# Patient Record
Sex: MALE | Race: WHITE | NOT HISPANIC OR LATINO | Employment: FULL TIME | URBAN - NONMETROPOLITAN AREA
[De-identification: names, ages, dates, MRNs, and addresses within clinical notes are randomized per-mention and may not be internally consistent; named-entity substitution may affect disease eponyms.]

---

## 2018-11-20 ENCOUNTER — TRANSCRIBE ORDERS (OUTPATIENT)
Dept: ADMINISTRATIVE | Facility: HOSPITAL | Age: 58
End: 2018-11-20

## 2018-11-20 ENCOUNTER — HOSPITAL ENCOUNTER (OUTPATIENT)
Dept: GENERAL RADIOLOGY | Facility: HOSPITAL | Age: 58
Discharge: HOME | End: 2018-11-20

## 2018-11-20 ENCOUNTER — HOSPITAL ENCOUNTER (OUTPATIENT)
Dept: GENERAL RADIOLOGY | Facility: HOSPITAL | Age: 58
Discharge: HOME | End: 2018-11-20
Admitting: NURSE PRACTITIONER

## 2018-11-20 DIAGNOSIS — R05.9 COUGH: ICD-10-CM

## 2018-11-20 DIAGNOSIS — M25.512 LEFT SHOULDER PAIN, UNSPECIFIED CHRONICITY: Primary | ICD-10-CM

## 2018-11-20 DIAGNOSIS — R07.81 RIB PAIN ON LEFT SIDE: ICD-10-CM

## 2018-11-20 PROCEDURE — 71045 X-RAY EXAM CHEST 1 VIEW: CPT

## 2018-11-20 PROCEDURE — 73030 X-RAY EXAM OF SHOULDER: CPT

## 2018-11-20 PROCEDURE — 71101 X-RAY EXAM UNILAT RIBS/CHEST: CPT

## 2018-11-27 ENCOUNTER — HOSPITAL ENCOUNTER (OUTPATIENT)
Dept: GENERAL RADIOLOGY | Facility: HOSPITAL | Age: 58
Discharge: HOME | End: 2018-11-27
Admitting: NURSE PRACTITIONER

## 2018-11-27 ENCOUNTER — TRANSCRIBE ORDERS (OUTPATIENT)
Dept: ADMINISTRATIVE | Facility: HOSPITAL | Age: 58
End: 2018-11-27

## 2018-11-27 DIAGNOSIS — R05.9 COUGH: Primary | ICD-10-CM

## 2018-11-27 DIAGNOSIS — Z72.0 TOBACCO USE: ICD-10-CM

## 2018-11-27 DIAGNOSIS — R05.9 COUGH: ICD-10-CM

## 2018-11-27 PROCEDURE — 71046 X-RAY EXAM CHEST 2 VIEWS: CPT

## 2022-09-21 ENCOUNTER — CONSULT (OUTPATIENT)
Dept: ONCOLOGY | Facility: CLINIC | Age: 62
End: 2022-09-21

## 2022-09-21 VITALS
WEIGHT: 199 LBS | TEMPERATURE: 98.4 F | OXYGEN SATURATION: 99 % | HEIGHT: 71 IN | RESPIRATION RATE: 16 BRPM | DIASTOLIC BLOOD PRESSURE: 83 MMHG | SYSTOLIC BLOOD PRESSURE: 145 MMHG | HEART RATE: 74 BPM | BODY MASS INDEX: 27.86 KG/M2

## 2022-09-21 DIAGNOSIS — J44.9 CHRONIC OBSTRUCTIVE PULMONARY DISEASE, UNSPECIFIED COPD TYPE: ICD-10-CM

## 2022-09-21 DIAGNOSIS — C34.32 CANCER OF LOWER LOBE OF LEFT LUNG: Primary | ICD-10-CM

## 2022-09-21 PROCEDURE — 99204 OFFICE O/P NEW MOD 45 MIN: CPT | Performed by: INTERNAL MEDICINE

## 2022-09-21 RX ORDER — ERGOCALCIFEROL 1.25 MG/1
50000 CAPSULE ORAL WEEKLY
COMMUNITY
Start: 2022-06-15 | End: 2022-09-21

## 2022-09-21 RX ORDER — ATORVASTATIN CALCIUM 20 MG/1
20 TABLET, FILM COATED ORAL DAILY
COMMUNITY
Start: 2022-07-13 | End: 2022-09-21

## 2022-09-21 NOTE — PROGRESS NOTES
DATE OF CONSULTATION: 9/21/2022    REFERRING PHYSICIAN: Yina Garces APRN    Dear Yina Yepez APRN  Thank you for asking for my medical advice on this patient. 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  Presented with enlarging left lower lobe lung nodule  B.  Status post lobectomy with lymph node sampling done by Dr. West at [facility] January 26, 2022  C.  Final pathology revealed 1.2 cm moderate differentiated adenosquamous carcinoma with a clear margins and negative nodes.  2.  Right lower lobe 9 mm lung nodule:  A.  Evident on CT chest done July 7, 2022  3.  Mild COPD    HISTORY OF PRESENT ILLNESS: The patient is a very pleasant 61 y.o.  male   who was in his usual state of health until July 2022.  Patient presented for routine surveillance CT scan that revealed right lower lobe lung nodule 9 mm in size with multiple hypodense liver lesion.  The patient does have history of lung cancer that was resected from left lower lobe January 2018.  He was referred to me for further recommendations.    SUBJECTIVE: When I saw the patient today he is here by himself.  He is doing fairly well.  Staying active.  He is reluctant to do another surgery if he needs to have thoracotomy since he had some difficulties after his first surgery with some pain and prolonged shortness of breath after the procedure.  He continues to smoke intermittently.    Review of Systems   Constitutional: Positive for fatigue.   Respiratory: Negative.    Cardiovascular: Negative.    Gastrointestinal: Negative.    Musculoskeletal: Positive for arthralgias.       Past Medical History:   Diagnosis Date   • Arthritis    • History of ear infections    • Hypertension    • Sinusitis        Social History     Socioeconomic History   • Marital  status:    Tobacco Use   • Smoking status: Current Every Day Smoker     Packs/day: 1.00     Years: 40.00     Pack years: 40.00     Types: Cigarettes   • Smokeless tobacco: Never Used   Substance and Sexual Activity   • Alcohol use: No   • Drug use: No       Family History   Problem Relation Age of Onset   • Cancer Mother    • Cancer Father    • Heart disease Father    • Cancer Maternal Grandfather        No past surgical history on file.    Allergies   Allergen Reactions   • Penicillins           Current Outpatient Medications:   •  azithromycin (ZITHROMAX Z-SOBIA) 250 MG tablet, Take 2 tablets the first day, then 1 tablet daily for 4 days., Disp: 6 tablet, Rfl: 0    PHYSICAL EXAMINATION:   There were no vitals taken for this visit.  There were no vitals filed for this visit.                ECOG Performance Status: 1 - Symptomatic but completely ambulatory  General Appearance:  alert, cooperative, no apparent distress and appears stated age   Neurologic/Psychiatric: A&O x 3, gait steady, appropriate affect, strength 5/5 in all muscle groups   HEENT:  Normocephalic, without obvious abnormality, mucous membranes moist   Neck: Supple, symmetrical, trachea midline, no adenopathy;  No thyromegaly, masses, or tenderness   Lungs:   Clear to auscultation bilaterally; respirations regular, even, and unlabored bilaterally   Heart:  Regular rate and rhythm, no murmurs appreciated   Abdomen:   Soft, non-tender, non-distended and no organomegaly   Lymph nodes: No cervical, supraclavicular, inguinal or axillary adenopathy noted   Extremities: Normal, atraumatic; no clubbing, cyanosis, or edema    Skin: No rashes, ulcers, or suspicious lesions noted       No visits with results within 2 Week(s) from this visit.   Latest known visit with results is:   No results found for any previous visit.        No results found.      DIAGNOSTIC DATA:   1.  Extensive patient medical records including doctor's notes, oncology consult,  surgical report, pathology report, blood work results and imaging report reviewed by me and documented the patient's chart.    ASSESSMENT: The patient is a very pleasant 61 y.o.  male  with left lower lobe lung cancer    PLAN:     1.  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  I had a long discussion today with the patient about his  new diagnosis of lung cancer. I did go over the final pathology report in  detail with both of them. I reviewed the patient's documents including refereing provider's notes, lab results, imaging, and path report.  B.  The patient did not require adjuvant chemotherapy which is appropriate and in compliance with NCCN guidelines.    2.  Right lower lobe 9 mm nodule:  A.  I did go over the CT scan result with the patient from July 2022.  B.  Given the worrisome findings on the CT scan and his history of lung cancer I think is extremely important to get whole-body PET scan to further evaluate the current abnormalities.  C.  The patient will follow-up with me in 2 weeks to go over the results.    3.  COPD:  A.  Asked patient that if he has to have surgery we have to check his PFTs prior.  B.  Continue inhalers.    FOLLOW UP: 2 weeks with PET scan prior    Thiago Celestin MD  9/21/2022

## 2022-10-04 ENCOUNTER — HOSPITAL ENCOUNTER (OUTPATIENT)
Dept: PET IMAGING | Facility: HOSPITAL | Age: 62
Discharge: HOME | End: 2022-10-04

## 2022-10-04 DIAGNOSIS — C34.32 CANCER OF LOWER LOBE OF LEFT LUNG: ICD-10-CM

## 2022-10-04 LAB — GLUCOSE BLDC GLUCOMTR-MCNC: 90 MG/DL (ref 70–130)

## 2022-10-04 PROCEDURE — 0 FLUDEOXYGLUCOSE F18 SOLUTION: Performed by: INTERNAL MEDICINE

## 2022-10-04 PROCEDURE — A9552 F18 FDG: HCPCS | Performed by: INTERNAL MEDICINE

## 2022-10-04 PROCEDURE — 82962 GLUCOSE BLOOD TEST: CPT

## 2022-10-04 PROCEDURE — 78815 PET IMAGE W/CT SKULL-THIGH: CPT

## 2022-10-04 RX ADMIN — FLUDEOXYGLUCOSE F18 1 DOSE: 300 INJECTION INTRAVENOUS at 14:44

## 2022-10-12 ENCOUNTER — OFFICE VISIT (OUTPATIENT)
Dept: ONCOLOGY | Facility: CLINIC | Age: 62
End: 2022-10-12

## 2022-10-12 VITALS
WEIGHT: 197 LBS | TEMPERATURE: 98.9 F | OXYGEN SATURATION: 97 % | BODY MASS INDEX: 27.58 KG/M2 | DIASTOLIC BLOOD PRESSURE: 82 MMHG | SYSTOLIC BLOOD PRESSURE: 118 MMHG | HEIGHT: 71 IN | RESPIRATION RATE: 14 BRPM | HEART RATE: 77 BPM

## 2022-10-12 DIAGNOSIS — C34.32 CANCER OF LOWER LOBE OF LEFT LUNG: Primary | ICD-10-CM

## 2022-10-12 PROCEDURE — 99214 OFFICE O/P EST MOD 30 MIN: CPT | Performed by: INTERNAL MEDICINE

## 2022-10-12 NOTE — PROGRESS NOTES
DATE OF VISIT: 10/12/2022    REASON FOR VISIT: Followup for left lower lobe lung cancer     PROBLEM LIST:  1.   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  Presented with enlarging left lower lobe lung nodule  B.  Status post lobectomy with lymph node sampling done by Dr. West at [facility] January 26, 2022  C.  Final pathology revealed 1.2 cm moderate differentiated adenosquamous carcinoma with a clear margins and negative nodes.  2.  Right lower lobe 9 mm lung nodule:  A.  Evident on CT chest done July 7, 2022  3.  Mild COPD      HISTORY OF PRESENT ILLNESS: The patient is a very pleasant 61 y.o. male  with past medical history significant for left lower lobe lung cancer diagnosed January 2022.  Given his early stage she did not require adjuvant treatment.  The patient is here today for scheduled follow-up visit.    SUBJECTIVE: The patient is here today with his sister.  He is doing fairly well.  He denied any fever chills night sweats.  He is anxious about the scan results.    Past History:  Medical History: has a past medical history of Arthritis, History of ear infections, Hypertension, and Sinusitis.   Surgical History: has a past surgical history that includes Lung lobectomy (Left).   Family History: family history includes Brain cancer in his father; Breast cancer in his maternal great-grandmother and paternal aunt; Cancer in his maternal grandfather; Colon cancer in his mother; Heart disease in his father.   Social History: reports that he has quit smoking. His smoking use included cigarettes. He has a 50.00 pack-year smoking history. He has never used smokeless tobacco. He reports that he does not drink alcohol and does not use drugs.    (Not in a hospital admission)     Allergies: Penicillins     Review of Systems   Constitutional: Positive for fatigue.   Respiratory: Negative.    Cardiovascular: Negative.    Gastrointestinal: Negative.        PHYSICAL EXAMINATION:   BP  "118/82   Pulse 77   Temp 98.9 °F (37.2 °C) (Temporal)   Resp 14   Ht 180.3 cm (71\")   Wt 89.4 kg (197 lb)   SpO2 97%   BMI 27.48 kg/m²    Pain Score    10/12/22 1449   PainSc: 0-No pain                     ECOG Performance Status: 1 - Symptomatic but completely ambulatory      General Appearance:      alert, cooperative, no apparent distress and appears stated age   Lungs:   Clear to auscultation bilaterally; respirations regular, even, and unlabored bilaterally   Heart:  Regular rate and rhythm, no murmurs appreciated   Abdomen:   Soft, non-tender, non-distended and no organomegaly                 Hospital Outpatient Visit on 10/04/2022   Component Date Value Ref Range Status   • Glucose 10/04/2022 90  70 - 130 mg/dL Final    Meter: [id number] : [id number] Kamilah Rocio        NM PET/CT Skull Base to Mid Thigh    Result Date: 10/4/2022  Narrative: DATE OF EXAM: 10/4/2022 2:30 PM  PROCEDURE: NM PET/CT SKULL BASE TO MID THIGH-  INDICATIONS: re-staging scans, lung nodule; C34.32-Malignant neoplasm of lower lobe, left bronchus or lung  TECHNIQUE: 12.66 mCi of F-18 labeled FDG was administered intravenously followed by PET imaging from the skull vertex through the mid thighs. Low-dose non contrast CT imaging of the same body region was performed. Fused PET-CT images and 3D MIP PET images were utilized for interpretation. Blood glucose level at the time of injection was 90 mg/dl.  COMPARISON: None  HEAD AND NECK: Physiologic hypermetabolism of the aerodigestive tract structures is present, without hypermetabolic cervical adenopathy or aerodigestive tract mass. Normal hypermetabolism of the cerebral hemispheres.  CHEST: There is physiologic hypermetabolism of the left ventricular myocardium. Postoperative changes are noted from prior left lower lobectomy. There is no discrete hypermetabolic mediastinal adenopathy or pulmonary nodularity.  ABDOMEN AND PELVIS: There is physiologic activity of the gastrointestinal " and genitourinary tracts, without focal hypermetabolism concerning for acute or neoplastic process. Osseous structures are unremarkable diffusely.      Impression: Negative PET/CT. Postoperative changes are noted from prior left lower lobectomy. There is no evidence of hypermetabolic pulmonary nodularity, thoracic adenopathy or distant metastatic involvement.  This report was finalized on 10/4/2022 4:04 PM by Troy Goncalves.        ASSESSMENT: The patient is a very pleasant 61 y.o. male  with left lower lobe lung cancer      PLAN:    1.  Left lower lobe lung cancer:  A.  I did go over the PET scan result with the patient on October 4, 2022.  Reassured the patient no evidence of abnormalities were noted.  B.  He will follow with us in 6 months with CT chest only.    2.  Right lower lobe 8 mm lung nodule:  A.  Not PET avid on the current PET scan.  Also stable in size.  B.  I will repeat CT chest in 6 months.    3.  Mild COPD,  A.  Patient continue inhalers as needed    FOLLOW UP: 6 months CT chest.    Thiago Celestin MD  10/12/2022

## 2023-04-10 ENCOUNTER — HOSPITAL ENCOUNTER (OUTPATIENT)
Dept: CT IMAGING | Facility: HOSPITAL | Age: 63
Discharge: HOME | End: 2023-04-10
Admitting: INTERNAL MEDICINE
Payer: COMMERCIAL

## 2023-04-10 DIAGNOSIS — C34.32 CANCER OF LOWER LOBE OF LEFT LUNG: ICD-10-CM

## 2023-04-10 PROCEDURE — 71260 CT THORAX DX C+: CPT

## 2023-04-10 PROCEDURE — 25510000001 IOPAMIDOL 61 % SOLUTION: Performed by: INTERNAL MEDICINE

## 2023-04-10 RX ADMIN — IOPAMIDOL 100 ML: 612 INJECTION, SOLUTION INTRAVENOUS at 10:34

## 2023-04-12 ENCOUNTER — OFFICE VISIT (OUTPATIENT)
Dept: ONCOLOGY | Facility: CLINIC | Age: 63
End: 2023-04-12
Payer: COMMERCIAL

## 2023-04-12 VITALS
TEMPERATURE: 98.9 F | OXYGEN SATURATION: 98 % | RESPIRATION RATE: 16 BRPM | HEIGHT: 71 IN | WEIGHT: 212 LBS | BODY MASS INDEX: 29.68 KG/M2 | DIASTOLIC BLOOD PRESSURE: 79 MMHG | SYSTOLIC BLOOD PRESSURE: 136 MMHG | HEART RATE: 85 BPM

## 2023-04-12 DIAGNOSIS — C34.32 CANCER OF LOWER LOBE OF LEFT LUNG: Primary | ICD-10-CM

## 2023-04-12 PROCEDURE — 99214 OFFICE O/P EST MOD 30 MIN: CPT | Performed by: INTERNAL MEDICINE

## 2023-04-12 NOTE — PROGRESS NOTES
DATE OF VISIT: 4/12/2023    REASON FOR VISIT: Followup for left lower lobe lung cancer     PROBLEM LIST:  1.   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  Presented with enlarging left lower lobe lung nodule  B.  Status post lobectomy with lymph node sampling done by Dr. West at [facility] January 26, 2022  C.  Final pathology revealed 1.2 cm moderate differentiated adenosquamous carcinoma with a clear margins and negative nodes.  2.  Right lower lobe 9 mm lung nodule:  A.  Evident on CT chest done July 7, 2022  3.  Mild COPD      HISTORY OF PRESENT ILLNESS: The patient is a very pleasant 62 y.o. male  with past medical history significant for left lower lobe lung cancer diagnosed January 2022.  Given his early stage she did not require adjuvant treatment.  The patient is here today for scheduled follow-up visit.    SUBJECTIVE: Erich is here today by himself.  He is doing fairly well.  He is anxious about the scan results.  His breathing is stable.    Past History:  Medical History: has a past medical history of Arthritis, History of ear infections, Hypertension, and Sinusitis.   Surgical History: has a past surgical history that includes Lung lobectomy (Left).   Family History: family history includes Brain cancer in his father; Breast cancer in his maternal great-grandmother and paternal aunt; Cancer in his maternal grandfather; Colon cancer in his mother; Heart disease in his father.   Social History: reports that he has quit smoking. His smoking use included cigarettes. He has a 50.00 pack-year smoking history. He has never used smokeless tobacco. He reports that he does not drink alcohol and does not use drugs.    (Not in a hospital admission)     Allergies: Penicillins     Review of Systems   Constitutional: Positive for fatigue.   Respiratory: Negative.    Cardiovascular: Negative.    Gastrointestinal: Negative.        PHYSICAL EXAMINATION:   There were no vitals taken for  this visit.   There were no vitals filed for this visit.                  ECOG Performance Status: 1 - Symptomatic but completely ambulatory      General Appearance:      alert, cooperative, no apparent distress and appears stated age   Lungs:   Clear to auscultation bilaterally; respirations regular, even, and unlabored bilaterally   Heart:  Regular rate and rhythm, no murmurs appreciated   Abdomen:   Soft, non-tender, non-distended and no organomegaly                 No visits with results within 2 Week(s) from this visit.   Latest known visit with results is:   Hospital Outpatient Visit on 10/04/2022   Component Date Value Ref Range Status   • Glucose 10/04/2022 90  70 - 130 mg/dL Final    Meter: [id number] : [id number] Kamilah Chan        CT Chest With Contrast Diagnostic    Result Date: 4/10/2023  Narrative: CT SCAN OF THE CHEST WITH CONTRAST    4/10/2023 10:19 AM  HISTORY: f/u scans; C34.32-Malignant neoplasm of lower lobe, left bronchus or lung  PROCEDURE: Multiple axial CT images were obtained from the lung apex to the mid abdomen following intravenous contrast administration. Sagittal and coronal reformatted images were generated from the axial data set and provided for interpretation. This study was performed with techniques to keep radiation doses as low as reasonably achievable, (ALARA). Individualized dose reduction techniques using automated exposure control or adjustment of mA and/or kV according to the patient size were employed.  COMPARISON: PET scan dated October 2022  FINDINGS:  CHEST: Lungs/Pleura: There are postsurgical changes of left lower lobectomy. There is emphysema with bullous changes. There is a stable right upper lobe 9 mm pulmonary nodule. No pneumothorax or pleural effusion.  Vessels: No large central pulmonary embolus. Thoracic aorta is normal in caliber.   Heart/mediastinum: The heart is normal in size. No pericardial effusion.  Lymph nodes: There are mildly prominent mediastinal  and hilar lymph nodes, unchanged.  Chest wall: No acute findings.  Bones: No acute fracture.  Upper abdomen: There are stable cystic lesions in the liver and left kidney.      Impression:  1. Stable exam of the chest. 2. No evidence of disease progression or recurrence.   243.62 mGy.cm   This study was performed with techniques to keep radiation doses as low as reasonably achievable (ALARA). Individualized dose reduction techniques using automated exposure control or adjustment of mA and/or kV according to the patient size were employed.    Images were reviewed, interpreted, and dictated by JOSE CARLOS Camacho Transcribed by Brenda Ham PA-C.  This report was signed and finalized on 4/10/2023 11:53 AM by JOSE CARLOS Boyd.      ASSESSMENT: The patient is a very pleasant 62 y.o. male  with left lower lobe lung cancer      PLAN:    1.  Left lower lobe lung cancer:  A.  I did go over the scan results with the patient from April 10, 2023.  I reassured the patient there is no evidence of disease recurrence or progression.  I reviewed the films myself and I went over pictures with him.  B.  We will continue watchful waiting.  The patient will follow-up with us in 6-month repeated scans.    2.  Right lower lobe 8 mm lung nodule:  A.  I reviewed the patient the scan films myself.  I went over the results with the patient and reassured him the lung nodule is essentially stable.  This was not PET avid before I will continue watchful waiting.  B.  I will repeat CT chest in 6 months.    3.  Mild COPD,  A.  Patient continue inhalers as needed    FOLLOW UP: 6 months CT chest.    Thiago Celestin MD  4/12/2023

## 2023-10-09 ENCOUNTER — HOSPITAL ENCOUNTER (OUTPATIENT)
Dept: CT IMAGING | Facility: HOSPITAL | Age: 63
Discharge: HOME | End: 2023-10-09
Admitting: INTERNAL MEDICINE
Payer: COMMERCIAL

## 2023-10-09 DIAGNOSIS — C34.32 CANCER OF LOWER LOBE OF LEFT LUNG: ICD-10-CM

## 2023-10-09 PROCEDURE — 25510000001 IOPAMIDOL 61 % SOLUTION: Performed by: INTERNAL MEDICINE

## 2023-10-09 PROCEDURE — 71260 CT THORAX DX C+: CPT

## 2023-10-09 RX ADMIN — IOPAMIDOL 100 ML: 612 INJECTION, SOLUTION INTRAVENOUS at 07:50

## 2023-10-11 ENCOUNTER — OFFICE VISIT (OUTPATIENT)
Dept: ONCOLOGY | Facility: CLINIC | Age: 63
End: 2023-10-11
Payer: COMMERCIAL

## 2023-10-11 ENCOUNTER — LAB (OUTPATIENT)
Dept: LAB | Facility: HOSPITAL | Age: 63
End: 2023-10-11
Payer: COMMERCIAL

## 2023-10-11 VITALS
OXYGEN SATURATION: 98 % | SYSTOLIC BLOOD PRESSURE: 139 MMHG | HEART RATE: 89 BPM | DIASTOLIC BLOOD PRESSURE: 84 MMHG | BODY MASS INDEX: 28.7 KG/M2 | HEIGHT: 71 IN | TEMPERATURE: 98 F | WEIGHT: 205 LBS | RESPIRATION RATE: 16 BRPM

## 2023-10-11 DIAGNOSIS — C34.32 CANCER OF LOWER LOBE OF LEFT LUNG: Primary | ICD-10-CM

## 2023-10-11 LAB
BASOPHILS # BLD AUTO: 0.05 10*3/MM3 (ref 0–0.2)
BASOPHILS NFR BLD AUTO: 0.4 % (ref 0–1.5)
DEPRECATED RDW RBC AUTO: 45.1 FL (ref 37–54)
EOSINOPHIL # BLD AUTO: 0.11 10*3/MM3 (ref 0–0.4)
EOSINOPHIL NFR BLD AUTO: 0.9 % (ref 0.3–6.2)
ERYTHROCYTE [DISTWIDTH] IN BLOOD BY AUTOMATED COUNT: 13.1 % (ref 12.3–15.4)
HCT VFR BLD AUTO: 44.5 % (ref 37.5–51)
HGB BLD-MCNC: 15 G/DL (ref 13–17.7)
IMM GRANULOCYTES # BLD AUTO: 0.03 10*3/MM3 (ref 0–0.05)
IMM GRANULOCYTES NFR BLD AUTO: 0.3 % (ref 0–0.5)
LYMPHOCYTES # BLD AUTO: 2.12 10*3/MM3 (ref 0.7–3.1)
LYMPHOCYTES NFR BLD AUTO: 18.1 % (ref 19.6–45.3)
MCH RBC QN AUTO: 31.4 PG (ref 26.6–33)
MCHC RBC AUTO-ENTMCNC: 33.7 G/DL (ref 31.5–35.7)
MCV RBC AUTO: 93.3 FL (ref 79–97)
MONOCYTES # BLD AUTO: 0.57 10*3/MM3 (ref 0.1–0.9)
MONOCYTES NFR BLD AUTO: 4.9 % (ref 5–12)
NEUTROPHILS NFR BLD AUTO: 75.4 % (ref 42.7–76)
NEUTROPHILS NFR BLD AUTO: 8.81 10*3/MM3 (ref 1.7–7)
NRBC BLD AUTO-RTO: 0 /100 WBC (ref 0–0.2)
PLATELET # BLD AUTO: 277 10*3/MM3 (ref 140–450)
PMV BLD AUTO: 10 FL (ref 6–12)
RBC # BLD AUTO: 4.77 10*6/MM3 (ref 4.14–5.8)
WBC NRBC COR # BLD: 11.69 10*3/MM3 (ref 3.4–10.8)

## 2023-10-11 PROCEDURE — 85025 COMPLETE CBC W/AUTO DIFF WBC: CPT | Performed by: NURSE PRACTITIONER

## 2023-10-11 PROCEDURE — 36415 COLL VENOUS BLD VENIPUNCTURE: CPT | Performed by: NURSE PRACTITIONER

## 2023-10-11 PROCEDURE — 99214 OFFICE O/P EST MOD 30 MIN: CPT | Performed by: NURSE PRACTITIONER

## 2023-10-11 NOTE — PROGRESS NOTES
DATE OF VISIT: 10/11/2023    REASON FOR VISIT: Followup for left lower lobe lung cancer     PROBLEM LIST:  1.   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  Presented with enlarging left lower lobe lung nodule  B.  Status post lobectomy with lymph node sampling done by Dr. West at [facility] January 26, 2022  C.  Final pathology revealed 1.2 cm moderate differentiated adenosquamous carcinoma with a clear margins and negative nodes.  2.  Right lower lobe 9 mm lung nodule:  A.  Evident on CT chest done July 7, 2022  3.  Mild COPD      HISTORY OF PRESENT ILLNESS: The patient is a very pleasant 62 y.o. male  with past medical history significant for left lower lobe lung cancer diagnosed January 2022.  Given his early stage she did not require adjuvant treatment.  The patient is here today for scheduled follow-up visit.    SUBJECTIVE: Erich is here today by himself.  Overall, he is doing fairly well.  He is anxious about his scan results.  Breathing is stable.      Past History:  Medical History: has a past medical history of Arthritis, History of ear infections, Hypertension, and Sinusitis.   Surgical History: has a past surgical history that includes Lung lobectomy (Left).   Family History: family history includes Brain cancer in his father; Breast cancer in his maternal great-grandmother and paternal aunt; Cancer in his maternal grandfather; Colon cancer in his mother; Heart disease in his father.   Social History: reports that he has quit smoking. His smoking use included cigarettes. He has a 50.00 pack-year smoking history. He has never used smokeless tobacco. He reports that he does not drink alcohol and does not use drugs.    (Not in a hospital admission)     Allergies: Penicillins     Review of Systems   Constitutional:  Positive for fatigue.   Respiratory: Negative.     Cardiovascular: Negative.    Gastrointestinal: Negative.    All other systems reviewed and are  "negative.      PHYSICAL EXAMINATION:   /84   Pulse 89   Temp 98 øF (36.7 øC) (Temporal)   Resp 16   Ht 180.3 cm (71\")   Wt 93 kg (205 lb)   SpO2 98%   BMI 28.59 kg/mý    Pain Score    10/11/23 1317   PainSc: 0-No pain        ECOG score: 0            ECOG Performance Status: 1 - Symptomatic but completely ambulatory      General Appearance:      alert, cooperative, no apparent distress and appears stated age   Lungs:   Clear to auscultation bilaterally; respirations regular, even, and unlabored bilaterally   Heart:  Regular rate and rhythm, no murmurs appreciated   Abdomen:   Soft, non-tender, and non-distended                 No visits with results within 2 Week(s) from this visit.   Latest known visit with results is:   Hospital Outpatient Visit on 10/04/2022   Component Date Value Ref Range Status    Glucose 10/04/2022 90  70 - 130 mg/dL Final    Meter: [id number] : [id number] Kamilah Beckwithi        CT Chest With Contrast Diagnostic    Result Date: 10/9/2023  Narrative: PROCEDURE: CT CHEST W CONTRAST DIAGNOSTIC-  HISTORY: f/u scans; C34.32-Malignant neoplasm of lower lobe, left bronchus or lung  COMPARISON: April 10, 2023  PROCEDURE: The patient was injected with IV contrast.  Axial images were obtained from the lung apex to the mid abdomen by computed tomography. This study was performed with techniques to keep radiation doses as low as reasonably achievable, (ALARA). Individualized dose reduction techniques using automated exposure control or adjustment of mA and/or kV according to the patient size were employed.  FINDINGS:  CHEST: There is no suspicious axillary adenopathy. Benign vascular calcifications noted. Postsurgical change from previous left lower lobectomy is stable. Again noted is the 9 mm minimally lobulated nodule left upper lobe, question slight interval increase in lobular contour compared to prior exam. Recommend 3 to 4-month follow-up CT. This is borderline for detection on PET/CT. " No bony destructive lesion identified. No other noncalcified nodules seen. There is no suspicious hilar or mediastinal adenopathy.  The heart is proper size. There is no pericardial or pleural effusion.  No suspicious infiltrate or nodule identified.  Limited images of the upper abdomen again demonstrate multiple, circumscribed, hypodense hepatic lesions likely representing cysts. Fullness of the left adrenal gland noted.      Impression: Question slight interval enlargement right upper lobe 9 mm noncalcified lobulated nodule; recommend 3 to 4-month follow-up CT. Lesion is borderline in size for detection on PET/CT.    CTDI: 4.48 mGy DLP:204.43 mGy.cm  This report was signed and finalized on 10/9/2023 9:06 AM by Rocio Zhu MD.         ASSESSMENT: The patient is a very pleasant 62 y.o. male  with left lower lobe lung cancer      PLAN:    1.  Left lower lobe lung cancer:  A.  I did go over the scan results with the patient from October 9 , 2023.  I discussed with the patient that scan showed a slight interval enlargement of the right upper lobe 9 mm noncalcified nodule.  We will plan to follow-up in 3 months instead of 6 for CT scan.   I reviewed the films myself and I went over pictures with him.  B.  We will continue watchful waiting.  The patient will follow-up with us in 3-months repeated scans.  C. We will check CBC and CMP today and I will call him with results.     2.  Right lower lobe 9 mm lung nodule:  A.  Slight interval increase of the size of the lung nodule.  This was not PET avid before I will continue watchful waiting.  B.  I will repeat CT chest in 3 months.    3.  Mild COPD,  A.  Patient continue inhalers as needed    FOLLOW UP: 3 months CT chest.    Eli Wright, APRN  10/11/2023

## 2024-01-24 ENCOUNTER — HOSPITAL ENCOUNTER (OUTPATIENT)
Dept: CT IMAGING | Facility: HOSPITAL | Age: 64
Discharge: HOME | End: 2024-01-24
Admitting: NURSE PRACTITIONER
Payer: COMMERCIAL

## 2024-01-24 DIAGNOSIS — C34.32 CANCER OF LOWER LOBE OF LEFT LUNG: ICD-10-CM

## 2024-01-24 PROCEDURE — 25510000001 IOPAMIDOL 61 % SOLUTION: Performed by: NURSE PRACTITIONER

## 2024-01-24 PROCEDURE — 71260 CT THORAX DX C+: CPT

## 2024-01-24 RX ADMIN — IOPAMIDOL 100 ML: 612 INJECTION, SOLUTION INTRAVENOUS at 07:44

## 2024-01-31 ENCOUNTER — OFFICE VISIT (OUTPATIENT)
Dept: ONCOLOGY | Facility: CLINIC | Age: 64
End: 2024-01-31
Payer: COMMERCIAL

## 2024-01-31 VITALS
DIASTOLIC BLOOD PRESSURE: 78 MMHG | OXYGEN SATURATION: 98 % | WEIGHT: 217 LBS | HEART RATE: 79 BPM | SYSTOLIC BLOOD PRESSURE: 149 MMHG | RESPIRATION RATE: 16 BRPM | TEMPERATURE: 98.2 F | HEIGHT: 71 IN | BODY MASS INDEX: 30.38 KG/M2

## 2024-01-31 DIAGNOSIS — C34.32 CANCER OF LOWER LOBE OF LEFT LUNG: Primary | ICD-10-CM

## 2024-01-31 PROCEDURE — 99214 OFFICE O/P EST MOD 30 MIN: CPT | Performed by: INTERNAL MEDICINE

## 2024-01-31 NOTE — PROGRESS NOTES
DATE OF VISIT: 1/31/2024    REASON FOR VISIT: Followup for left lower lobe lung cancer     PROBLEM LIST:  1.   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  Presented with enlarging left lower lobe lung nodule  B.  Status post lobectomy with lymph node sampling done by Dr. West at [facility] January 26, 2022  C.  Final pathology revealed 1.2 cm moderate differentiated adenosquamous carcinoma with a clear margins and negative nodes.  2.  Right lower lobe 9 mm lung nodule:  A.  Evident on CT chest done July 7, 2022  3.  Mild COPD      HISTORY OF PRESENT ILLNESS: The patient is a very pleasant 63 y.o. male  with past medical history significant for left lower lobe lung cancer diagnosed January 2022.  Given his early stage she did not require adjuvant treatment.  The patient is here today for scheduled follow-up visit.    SUBJECTIVE: Erich is here today by himself.  All in all he is doing fairly well.  Any fever chills night the sweats.  He is anxious about the scan result.    Past History:  Medical History: has a past medical history of Arthritis, History of ear infections, Hypertension, and Sinusitis.   Surgical History: has a past surgical history that includes Lung lobectomy (Left).   Family History: family history includes Brain cancer in his father; Breast cancer in his maternal great-grandmother and paternal aunt; Cancer in his maternal grandfather; Colon cancer in his mother; Heart disease in his father.   Social History: reports that he has quit smoking. His smoking use included cigarettes. He has a 50.00 pack-year smoking history. He has never used smokeless tobacco. He reports that he does not drink alcohol and does not use drugs.    (Not in a hospital admission)     Allergies: Penicillins     Review of Systems   Constitutional:  Positive for fatigue.   Respiratory: Negative.     Cardiovascular: Negative.    Gastrointestinal: Negative.        PHYSICAL EXAMINATION:   BP  "149/78   Pulse 79   Temp 98.2 °F (36.8 °C)   Resp 16   Ht 180.3 cm (71\")   Wt 98.4 kg (217 lb)   SpO2 98%   BMI 30.27 kg/m²    Pain Score    01/31/24 1257   PainSc: 0-No pain                       ECOG Performance Status: 1 - Symptomatic but completely ambulatory      General Appearance:      alert, cooperative, no apparent distress and appears stated age   Lungs:   Clear to auscultation bilaterally; respirations regular, even, and unlabored bilaterally   Heart:  Regular rate and rhythm, no murmurs appreciated   Abdomen:   Soft, non-tender, and non-distended                 No visits with results within 2 Week(s) from this visit.   Latest known visit with results is:   Office Visit on 10/11/2023   Component Date Value Ref Range Status    WBC 10/11/2023 11.69 (H)  3.40 - 10.80 10*3/mm3 Final    RBC 10/11/2023 4.77  4.14 - 5.80 10*6/mm3 Final    Hemoglobin 10/11/2023 15.0  13.0 - 17.7 g/dL Final    Hematocrit 10/11/2023 44.5  37.5 - 51.0 % Final    MCV 10/11/2023 93.3  79.0 - 97.0 fL Final    MCH 10/11/2023 31.4  26.6 - 33.0 pg Final    MCHC 10/11/2023 33.7  31.5 - 35.7 g/dL Final    RDW 10/11/2023 13.1  12.3 - 15.4 % Final    RDW-SD 10/11/2023 45.1  37.0 - 54.0 fl Final    MPV 10/11/2023 10.0  6.0 - 12.0 fL Final    Platelets 10/11/2023 277  140 - 450 10*3/mm3 Final    Neutrophil % 10/11/2023 75.4  42.7 - 76.0 % Final    Lymphocyte % 10/11/2023 18.1 (L)  19.6 - 45.3 % Final    Monocyte % 10/11/2023 4.9 (L)  5.0 - 12.0 % Final    Eosinophil % 10/11/2023 0.9  0.3 - 6.2 % Final    Basophil % 10/11/2023 0.4  0.0 - 1.5 % Final    Immature Grans % 10/11/2023 0.3  0.0 - 0.5 % Final    Neutrophils, Absolute 10/11/2023 8.81 (H)  1.70 - 7.00 10*3/mm3 Final    Lymphocytes, Absolute 10/11/2023 2.12  0.70 - 3.10 10*3/mm3 Final    Monocytes, Absolute 10/11/2023 0.57  0.10 - 0.90 10*3/mm3 Final    Eosinophils, Absolute 10/11/2023 0.11  0.00 - 0.40 10*3/mm3 Final    Basophils, Absolute 10/11/2023 0.05  0.00 - 0.20 10*3/mm3 " Final    Immature Grans, Absolute 10/11/2023 0.03  0.00 - 0.05 10*3/mm3 Final    nRBC 10/11/2023 0.0  0.0 - 0.2 /100 WBC Final        CT Chest With Contrast Diagnostic    Result Date: 1/24/2024  Narrative: PROCEDURE: CT CHEST W CONTRAST DIAGNOSTIC-  HISTORY: Follow up CT scan; C34.32-Malignant neoplasm of lower lobe, left bronchus or lung  COMPARISON: October 9, 2023  PROCEDURE: The patient was injected with IV contrast.  Axial images were obtained from the lung apex to the mid abdomen by computed tomography. This study was performed with techniques to keep radiation doses as low as reasonably achievable, (ALARA). Individualized dose reduction techniques using automated exposure control or adjustment of mA and/or kV according to the patient size were employed.  FINDINGS:  CHEST: There is no suspicious axillary adenopathy. There is no new suspicious hilar or mediastinal adenopathy.  The heart is proper size. There is no pericardial or pleural effusion. Again noted is the mildly lobulated solid nodule right upper lobe anterior laterally measuring up to 8.4 mm, not significantly changed from the prior exam. Mild emphysematous change noted. There is stable linear scarring left lower lobe. Again noted is postsurgical change in the left lung with volume loss, elevation left hemidiaphragm and surgical clips in the left hilum consistent with left lower lobectomy; findings are stable. Limited images of the upper abdomen again demonstrates several circumscribed hypodense hepatic lesions consistent with cysts. Vascular calcifications noted. No bony destructive lesion seen.      Impression: Stable 8.4 mm right upper lobe nodule, recommend continued follow-up given the history of previous left lung malignancy.    CTDI: 4.29 mGy DLP:185.7 mGy.cm  This report was signed and finalized on 1/24/2024 8:19 AM by Rocio Zhu MD.         ASSESSMENT: The patient is a very pleasant 63 y.o. male  with left lower lobe lung  cancer      PLAN:    1.  Left lower lobe lung cancer:  A.  I did go over the CT scan result with the patient from July 24, 2024.  No evidence of recurrence on the left side.    2.  Right lower lobe 9 mm lung nodule:  A.  I reassured the patient lung nodule stable on the current CT scan done July 24, 2024.  Will continue surveillance.    3.  Mild COPD,  A.  Patient continue inhalers as needed    FOLLOW UP: 6 months CT chest.    Thiago Celestin MD  1/31/2024

## 2024-07-23 ENCOUNTER — TELEPHONE (OUTPATIENT)
Dept: ONCOLOGY | Facility: CLINIC | Age: 64
End: 2024-07-23

## 2024-07-23 NOTE — TELEPHONE ENCOUNTER
Caller: Erich Wilkes    Relationship to patient: Self    Best call back number: [phone]     Chief complaint: R/S    Type of visit: FOLLOW UP 1    Requested date: PLEASE CALL PT TO R/S FOR MATTHEW, WILL NEED A DIFFERENT DAY AND ASKING FOR EARLIEST TIME POSSIBLE.  OK TO LEAVE VM IF NO ANSWER.     If rescheduling, when is the original appointment: 7/31

## 2024-07-29 ENCOUNTER — HOSPITAL ENCOUNTER (OUTPATIENT)
Dept: CT IMAGING | Facility: HOSPITAL | Age: 64
Discharge: HOME | End: 2024-07-29
Admitting: INTERNAL MEDICINE
Payer: COMMERCIAL

## 2024-07-29 DIAGNOSIS — C34.32 CANCER OF LOWER LOBE OF LEFT LUNG: ICD-10-CM

## 2024-07-29 PROCEDURE — 71260 CT THORAX DX C+: CPT

## 2024-07-29 PROCEDURE — 25510000001 IOPAMIDOL 61 % SOLUTION: Performed by: INTERNAL MEDICINE

## 2024-07-29 RX ADMIN — IOPAMIDOL 100 ML: 612 INJECTION, SOLUTION INTRAVENOUS at 08:36

## 2024-08-14 ENCOUNTER — OFFICE VISIT (OUTPATIENT)
Dept: ONCOLOGY | Facility: CLINIC | Age: 64
End: 2024-08-14
Payer: COMMERCIAL

## 2024-08-14 VITALS
HEART RATE: 70 BPM | DIASTOLIC BLOOD PRESSURE: 76 MMHG | RESPIRATION RATE: 16 BRPM | SYSTOLIC BLOOD PRESSURE: 137 MMHG | TEMPERATURE: 97.7 F | BODY MASS INDEX: 29.6 KG/M2 | WEIGHT: 211.4 LBS | OXYGEN SATURATION: 98 % | HEIGHT: 71 IN

## 2024-08-14 DIAGNOSIS — C34.32 CANCER OF LOWER LOBE OF LEFT LUNG: Primary | ICD-10-CM

## 2024-08-14 PROCEDURE — 99214 OFFICE O/P EST MOD 30 MIN: CPT | Performed by: INTERNAL MEDICINE

## 2024-08-14 NOTE — PROGRESS NOTES
DATE OF VISIT: 8/14/2024    REASON FOR VISIT: Followup for left lower lobe lung cancer     PROBLEM LIST:  1.   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  Presented with enlarging left lower lobe lung nodule  B.  Status post lobectomy with lymph node sampling done by Dr. West at [facility] January 26, 2022  C.  Final pathology revealed 1.2 cm moderate differentiated adenosquamous carcinoma with a clear margins and negative nodes.  2.  Right lower lobe 9 mm lung nodule:  A.  Evident on CT chest done July 7, 2022  3.  Mild COPD      HISTORY OF PRESENT ILLNESS: The patient is a very pleasant 63 y.o. male  with past medical history significant for left lower lobe lung cancer diagnosed January 2022.  Given his early stage she did not require adjuvant treatment.  The patient is here today for scheduled follow-up visit.    SUBJECTIVE: Erich is here today by himself.  All in all he is doing well.  He denies any fever chills or night sweats.    Past History:  Medical History: has a past medical history of Arthritis, History of ear infections, Hypertension, and Sinusitis.   Surgical History: has a past surgical history that includes Lung lobectomy (Left).   Family History: family history includes Brain cancer in his father; Breast cancer in his maternal great-grandmother and paternal aunt; Cancer in his maternal grandfather; Colon cancer in his mother; Heart disease in his father.   Social History: reports that he has quit smoking. His smoking use included cigarettes. He has a 50 pack-year smoking history. He has never used smokeless tobacco. He reports that he does not drink alcohol and does not use drugs.    (Not in a hospital admission)     Allergies: Penicillins     Review of Systems   Constitutional:  Positive for fatigue.   Respiratory: Negative.     Cardiovascular: Negative.    Gastrointestinal: Negative.        PHYSICAL EXAMINATION:   /76   Pulse 70   Temp 97.7 °F (36.5 °C)  "  Resp 16   Ht 180.3 cm (70.98\")   Wt 95.9 kg (211 lb 6.4 oz)   SpO2 98%   BMI 29.50 kg/m²    Pain Score    08/14/24 0846   PainSc: 0-No pain          ECOG score: 0            ECOG Performance Status: 1 - Symptomatic but completely ambulatory      General Appearance:      alert, cooperative, no apparent distress and appears stated age   Lungs:   Clear to auscultation bilaterally; respirations regular, even, and unlabored bilaterally   Heart:  Regular rate and rhythm, no murmurs appreciated   Abdomen:   Soft, non-tender, and non-distended                 No visits with results within 2 Week(s) from this visit.   Latest known visit with results is:   Office Visit on 10/11/2023   Component Date Value Ref Range Status    WBC 10/11/2023 11.69 (H)  3.40 - 10.80 10*3/mm3 Final    RBC 10/11/2023 4.77  4.14 - 5.80 10*6/mm3 Final    Hemoglobin 10/11/2023 15.0  13.0 - 17.7 g/dL Final    Hematocrit 10/11/2023 44.5  37.5 - 51.0 % Final    MCV 10/11/2023 93.3  79.0 - 97.0 fL Final    MCH 10/11/2023 31.4  26.6 - 33.0 pg Final    MCHC 10/11/2023 33.7  31.5 - 35.7 g/dL Final    RDW 10/11/2023 13.1  12.3 - 15.4 % Final    RDW-SD 10/11/2023 45.1  37.0 - 54.0 fl Final    MPV 10/11/2023 10.0  6.0 - 12.0 fL Final    Platelets 10/11/2023 277  140 - 450 10*3/mm3 Final    Neutrophil % 10/11/2023 75.4  42.7 - 76.0 % Final    Lymphocyte % 10/11/2023 18.1 (L)  19.6 - 45.3 % Final    Monocyte % 10/11/2023 4.9 (L)  5.0 - 12.0 % Final    Eosinophil % 10/11/2023 0.9  0.3 - 6.2 % Final    Basophil % 10/11/2023 0.4  0.0 - 1.5 % Final    Immature Grans % 10/11/2023 0.3  0.0 - 0.5 % Final    Neutrophils, Absolute 10/11/2023 8.81 (H)  1.70 - 7.00 10*3/mm3 Final    Lymphocytes, Absolute 10/11/2023 2.12  0.70 - 3.10 10*3/mm3 Final    Monocytes, Absolute 10/11/2023 0.57  0.10 - 0.90 10*3/mm3 Final    Eosinophils, Absolute 10/11/2023 0.11  0.00 - 0.40 10*3/mm3 Final    Basophils, Absolute 10/11/2023 0.05  0.00 - 0.20 10*3/mm3 Final    Immature Grans, " Absolute 10/11/2023 0.03  0.00 - 0.05 10*3/mm3 Final    nRBC 10/11/2023 0.0  0.0 - 0.2 /100 WBC Final        CT Chest With Contrast Diagnostic    Result Date: 7/29/2024  Narrative: PROCEDURE: CT CHEST W CONTRAST DIAGNOSTIC-  HISTORY: f/u scans; C34.32-Malignant neoplasm of lower lobe, left bronchus or lung  COMPARISON: January 24, 2024.  PROCEDURE: Multiple axial CT images were obtained from the thoracic inlet through the upper abdomen following the administration of intravenous contrast.  FINDINGS: Soft tissue windows demonstrate no adenopathy within the chest. No supraclavicular or axillary lymph nodes are identified. The heart is borderline in size. There are vascular calcifications. The aorta is normal in caliber.There is no pericardial or pleural effusion. There is an 8 mm triangular nodule in the right upper lobe which is stable. No new pulmonary nodule defied. There is mild emphysematous change. There are postsurgical changes of the left hemithorax. Left hilar surgical clips are seen. There is volume loss in the left lung. There is scarring at the left lung base. The visualized upper abdomen shows partially visualized hypodense hepatic lesions which appear similar to the prior exam. There is a left renal cyst. Bone windows reveal no bony destructive lesions.      Impression: Stable 8 mm right upper lobe nodule.  No new pulmonary mass or nodule is identified.   This study was performed with techniques to keep radiation doses as low as reasonably achievable (ALARA). Individualized dose reduction techniques using automated exposure control or adjustment of mA and/or kV according to the patient size were employed.    CTDI: 4.91 mGy DLP: 226.47 mGy.cm      Images were reviewed, interpreted, and dictated by Dr. Rocio Zhu MD Transcribed by Brenda Hma PA-C.  This report was signed and finalized on 7/29/2024 9:35 AM by Rocio Zhu MD.         ASSESSMENT: The patient is a very pleasant 63 y.o. male  with left  lower lobe lung cancer      PLAN:    1.  Left lower lobe lung cancer:  A.  I did go over the CT scan result with the patient from July 29, 2024 it did reveal stable findings no evidence of recurrent cancer.    2.  Right lower lobe 9 mm lung nodule:  A.  I reassured the patient his right upper lobe nodule remains stable 8 mm.  B.  If everything remains stable I will switch him to annual visit after next time.    3.  Mild COPD,  A.  Patient continue inhalers as needed    FOLLOW UP: 6 months CT chest.    Thiago Celestin MD  8/14/2024

## 2025-02-05 ENCOUNTER — TELEPHONE (OUTPATIENT)
Dept: ONCOLOGY | Facility: CLINIC | Age: 65
End: 2025-02-05
Payer: COMMERCIAL

## 2025-02-05 NOTE — TELEPHONE ENCOUNTER
Caller: Erich Wilkes    Relationship to patient: Self    Best call back number:   Telephone Information:   Mobile [phone]     Chief complaint: NEEDS TO BE AFTER CT SCAN     Type of visit: F/U 1    Requested date: AFTER 2/14/2025 CALL TO R/S     If rescheduling, when is the original appointment:2/12/2025

## 2025-02-14 ENCOUNTER — HOSPITAL ENCOUNTER (OUTPATIENT)
Dept: CT IMAGING | Facility: HOSPITAL | Age: 65
Discharge: HOME | End: 2025-02-14
Admitting: INTERNAL MEDICINE
Payer: COMMERCIAL

## 2025-02-14 DIAGNOSIS — C34.32 CANCER OF LOWER LOBE OF LEFT LUNG: ICD-10-CM

## 2025-02-14 PROCEDURE — 71260 CT THORAX DX C+: CPT

## 2025-02-14 PROCEDURE — 25510000001 IOPAMIDOL 61 % SOLUTION: Performed by: INTERNAL MEDICINE

## 2025-02-14 RX ORDER — IOPAMIDOL 612 MG/ML
100 INJECTION, SOLUTION INTRAVASCULAR
Status: COMPLETED | OUTPATIENT
Start: 2025-02-14 | End: 2025-02-14

## 2025-02-14 RX ADMIN — IOPAMIDOL 100 ML: 612 INJECTION, SOLUTION INTRAVENOUS at 07:43

## 2025-02-19 ENCOUNTER — OFFICE VISIT (OUTPATIENT)
Dept: ONCOLOGY | Facility: CLINIC | Age: 65
End: 2025-02-19
Payer: COMMERCIAL

## 2025-02-19 VITALS
HEIGHT: 71 IN | SYSTOLIC BLOOD PRESSURE: 147 MMHG | HEART RATE: 87 BPM | BODY MASS INDEX: 31.06 KG/M2 | DIASTOLIC BLOOD PRESSURE: 78 MMHG | TEMPERATURE: 98.4 F | OXYGEN SATURATION: 97 % | RESPIRATION RATE: 16 BRPM | WEIGHT: 221.9 LBS

## 2025-02-19 DIAGNOSIS — C34.32 CANCER OF LOWER LOBE OF LEFT LUNG: Primary | ICD-10-CM

## 2025-02-19 PROCEDURE — 99214 OFFICE O/P EST MOD 30 MIN: CPT | Performed by: INTERNAL MEDICINE

## 2025-02-19 NOTE — PROGRESS NOTES
DATE OF VISIT: 2/19/2025    REASON FOR VISIT: Followup for left lower lobe lung cancer     PROBLEM LIST:  1.   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  Presented with enlarging left lower lobe lung nodule  B.  Status post lobectomy with lymph node sampling done by Dr. West at [facility] January 26, 2022  C.  Final pathology revealed 1.2 cm moderate differentiated adenosquamous carcinoma with a clear margins and negative nodes.  2.  Right lower lobe 9 mm lung nodule:  A.  Evident on CT chest done July 7, 2022  3.  Mild COPD      HISTORY OF PRESENT ILLNESS: The patient is a very pleasant 64 y.o. male  with past medical history significant for left lower lobe lung cancer diagnosed January 2022.  Given his early stage she did not require adjuvant treatment.  The patient is here today for scheduled follow-up visit.    SUBJECTIVE: Erich is here today by himself.  All in all he is doing well.  Denies any fever chills or night sweats.  His breathing is stable.    Past History:  Medical History: has a past medical history of Arthritis, History of ear infections, Hypertension, and Sinusitis.   Surgical History: has a past surgical history that includes Lung lobectomy (Left).   Family History: family history includes Brain cancer in his father; Breast cancer in his maternal great-grandmother and paternal aunt; Cancer in his maternal grandfather; Colon cancer in his mother; Heart disease in his father.   Social History: reports that he has quit smoking. His smoking use included cigarettes. He has a 50 pack-year smoking history. He has never used smokeless tobacco. He reports that he does not drink alcohol and does not use drugs.    (Not in a hospital admission)     Allergies: Penicillins     Review of Systems   Constitutional:  Positive for fatigue.   Respiratory: Negative.     Cardiovascular: Negative.    Gastrointestinal: Negative.        PHYSICAL EXAMINATION:   /78   Pulse 87    "Temp 98.4 °F (36.9 °C)   Resp 16   Ht 180.3 cm (70.98\")   Wt 101 kg (221 lb 14.4 oz)   SpO2 97%   BMI 30.96 kg/m²    Pain Score    02/19/25 1422   PainSc: 0-No pain                      ECOG Performance Status: 1 - Symptomatic but completely ambulatory      General Appearance:      alert, cooperative, no apparent distress and appears stated age   Lungs:   Clear to auscultation bilaterally; respirations regular, even, and unlabored bilaterally   Heart:  Regular rate and rhythm, no murmurs appreciated   Abdomen:   Soft, non-tender, and non-distended                 No visits with results within 2 Week(s) from this visit.   Latest known visit with results is:   Office Visit on 10/11/2023   Component Date Value Ref Range Status    WBC 10/11/2023 11.69 (H)  3.40 - 10.80 10*3/mm3 Final    RBC 10/11/2023 4.77  4.14 - 5.80 10*6/mm3 Final    Hemoglobin 10/11/2023 15.0  13.0 - 17.7 g/dL Final    Hematocrit 10/11/2023 44.5  37.5 - 51.0 % Final    MCV 10/11/2023 93.3  79.0 - 97.0 fL Final    MCH 10/11/2023 31.4  26.6 - 33.0 pg Final    MCHC 10/11/2023 33.7  31.5 - 35.7 g/dL Final    RDW 10/11/2023 13.1  12.3 - 15.4 % Final    RDW-SD 10/11/2023 45.1  37.0 - 54.0 fl Final    MPV 10/11/2023 10.0  6.0 - 12.0 fL Final    Platelets 10/11/2023 277  140 - 450 10*3/mm3 Final    Neutrophil % 10/11/2023 75.4  42.7 - 76.0 % Final    Lymphocyte % 10/11/2023 18.1 (L)  19.6 - 45.3 % Final    Monocyte % 10/11/2023 4.9 (L)  5.0 - 12.0 % Final    Eosinophil % 10/11/2023 0.9  0.3 - 6.2 % Final    Basophil % 10/11/2023 0.4  0.0 - 1.5 % Final    Immature Grans % 10/11/2023 0.3  0.0 - 0.5 % Final    Neutrophils, Absolute 10/11/2023 8.81 (H)  1.70 - 7.00 10*3/mm3 Final    Lymphocytes, Absolute 10/11/2023 2.12  0.70 - 3.10 10*3/mm3 Final    Monocytes, Absolute 10/11/2023 0.57  0.10 - 0.90 10*3/mm3 Final    Eosinophils, Absolute 10/11/2023 0.11  0.00 - 0.40 10*3/mm3 Final    Basophils, Absolute 10/11/2023 0.05  0.00 - 0.20 10*3/mm3 Final    " Immature Grans, Absolute 10/11/2023 0.03  0.00 - 0.05 10*3/mm3 Final    nRBC 10/11/2023 0.0  0.0 - 0.2 /100 WBC Final        CT Chest With Contrast Diagnostic    Result Date: 2/14/2025  Narrative: CTA/PE PROTOCOL CHEST CT:     2/14/2025 7:30 AM  CLINICAL INDICATION: f/u scans lung cancer; C34.32-Malignant neoplasm of lower lobe, left bronchus or lung  TECHNIQUE: Multiple axial CT images were obtained through the chest following IV contrast utilizing a CTA/PE protocol. 3D/MIP Reconstruction images were also performed. This study was performed with techniques to keep radiation doses as low as reasonably achievable, (ALARA). Individualized dose reduction techniques using automated exposure control or adjustment of mA and/or kV according to the patient size were employed.  COMPARISON: CT chest 7/29/2024, 1/24/2024, 10/9/2024, and 4/10/2023.  FINDINGS: PA's and Aorta: No pulmonary embolus. Thoracic aorta is normal in caliber. Moderate coronary artery atherosclerotic calcifications.  Heart/mediastinum: No evidence for right heart strain. No pericardial effusion. The heart is normal in size.  Lungs/Pleura: Prior left lower lobectomy. Stable lateral right upper lobe ill-defined 8 mm pulmonary nodule (series 2, image 39). Centrilobular emphysema. Bilateral dependent lung atelectasis. No pneumothorax or pleural effusion.  Lymph nodes: No pathologically enlarged thoracic lymph nodes. Mildly prominent right upper paratracheal lymph node measuring 10 mm (series 2, image 33) and prevascular lymph node measuring 8 mm (series 2, image 38), unchanged  Chest wall: No acute findings.  Bones: No acute fracture.  Upper abdomen: No acute findings in the upper abdomen. Innumerable hypodense liver lesions, compatible with simple cysts, grossly unchanged from prior exam. Left renal simple cyst, unchanged. Mild aortic atherosclerotic calcifications. No adrenal nodule. Pancreas is unremarkable.      Impression: Stable postsurgical changes in  the left hemithorax. Stable lateral right upper lobe 8 mm ill-defined pulmonary nodule. No evidence of disease progression or recurrence. No new suspicious findings. Continued follow-up is recommended.      Images personally reviewed, interpreted and dictated by JOSE CARLOS Boyd.  263.67 mGy.cm    This study was performed with techniques to keep radiation doses as low as reasonably achievable (ALARA). Individualized dose reduction techniques using automated exposure control or adjustment of mA and/or kV according to the patient size were employed.    This report was signed and finalized on 2/14/2025 8:14 AM by JOSE CARLOS Boyd.         ASSESSMENT: The patient is a very pleasant 64 y.o. male  with left lower lobe lung cancer      PLAN:    1.  Left lower lobe lung cancer:  A.  I did go over the scan result with the patient 3/14/2025 and reassured him stable postsurgical changes in the left side with stable 8 mm lung nodule in the right upper lobe.  No evidence of new or progressive disease.  B.  I will continue watchful waiting.  He will follow me in 6 months and rescan if it remains stable I will switch him to annual visits at that point.    2.  Right lower lobe 9 mm lung nodule:  A.  I reassured the patient his right upper lobe nodule remains stable 8 mm.  B.  I will do 1 more 6 months follow-up then we will switch to annual visits.    3.  Mild COPD,  A.  Patient continue inhalers as needed    FOLLOW UP: 6 months CT chest.    Thiago Celestin MD  2/19/2025

## 2025-08-18 ENCOUNTER — HOSPITAL ENCOUNTER (OUTPATIENT)
Dept: CT IMAGING | Facility: HOSPITAL | Age: 65
Discharge: HOME | End: 2025-08-18
Admitting: INTERNAL MEDICINE
Payer: COMMERCIAL

## 2025-08-18 DIAGNOSIS — C34.32 CANCER OF LOWER LOBE OF LEFT LUNG: ICD-10-CM

## 2025-08-18 PROCEDURE — 71260 CT THORAX DX C+: CPT

## 2025-08-18 PROCEDURE — 25510000001 IOPAMIDOL 61 % SOLUTION: Performed by: INTERNAL MEDICINE

## 2025-08-18 RX ORDER — IOPAMIDOL 612 MG/ML
100 INJECTION, SOLUTION INTRAVASCULAR
Status: COMPLETED | OUTPATIENT
Start: 2025-08-18 | End: 2025-08-18

## 2025-08-18 RX ADMIN — IOPAMIDOL 100 ML: 612 INJECTION, SOLUTION INTRAVENOUS at 07:38

## 2025-08-20 ENCOUNTER — OFFICE VISIT (OUTPATIENT)
Dept: ONCOLOGY | Facility: CLINIC | Age: 65
End: 2025-08-20
Payer: COMMERCIAL

## 2025-08-20 VITALS
HEIGHT: 71 IN | DIASTOLIC BLOOD PRESSURE: 97 MMHG | SYSTOLIC BLOOD PRESSURE: 147 MMHG | RESPIRATION RATE: 16 BRPM | HEART RATE: 77 BPM | WEIGHT: 206 LBS | BODY MASS INDEX: 28.84 KG/M2 | TEMPERATURE: 98.7 F | OXYGEN SATURATION: 96 %

## 2025-08-20 DIAGNOSIS — C34.32 CANCER OF LOWER LOBE OF LEFT LUNG: Primary | ICD-10-CM

## 2025-08-20 PROCEDURE — 99214 OFFICE O/P EST MOD 30 MIN: CPT | Performed by: INTERNAL MEDICINE
